# Patient Record
Sex: MALE | Race: WHITE | NOT HISPANIC OR LATINO | Employment: FULL TIME | URBAN - METROPOLITAN AREA
[De-identification: names, ages, dates, MRNs, and addresses within clinical notes are randomized per-mention and may not be internally consistent; named-entity substitution may affect disease eponyms.]

---

## 2012-09-21 LAB — CRC RECOMMENDATION EXT: NORMAL

## 2018-06-01 ENCOUNTER — HISTORICAL (OUTPATIENT)
Dept: ADMINISTRATIVE | Facility: HOSPITAL | Age: 58
End: 2018-06-01

## 2018-06-01 LAB
ALBUMIN SERPL-MCNC: 4.4 GM/DL (ref 3.4–5)
ALBUMIN/GLOB SERPL: 1.91 {RATIO} (ref 1.5–2.5)
ALP SERPL-CCNC: 59 UNIT/L (ref 38–126)
ALT SERPL-CCNC: 15 UNIT/L (ref 7–52)
AST SERPL-CCNC: 16 UNIT/L (ref 15–37)
BILIRUB SERPL-MCNC: 1.1 MG/DL (ref 0.2–1)
BILIRUBIN DIRECT+TOT PNL SERPL-MCNC: 0.2 MG/DL (ref 0–0.5)
BUN SERPL-MCNC: 14 MG/DL (ref 7–18)
CALCIUM SERPL-MCNC: 9.1 MG/DL (ref 8.5–10)
CHLORIDE SERPL-SCNC: 106 MMOL/L (ref 98–107)
CHOLEST SERPL-MCNC: 141 MG/DL (ref 0–200)
CHOLEST/HDLC SERPL: 3.4 {RATIO}
CO2 SERPL-SCNC: 28 MMOL/L (ref 21–32)
CREAT SERPL-MCNC: 1.29 MG/DL (ref 0.6–1.3)
CREAT/UREA NIT SERPL: 10.9
GGT SERPL-CCNC: ABNORMAL U/L (ref 5–85)
GLOBULIN SER-MCNC: 2.3 GM/DL (ref 1.2–3)
GLUCOSE SERPL-MCNC: 107 MG/DL (ref 74–106)
HDLC SERPL-MCNC: 42 MG/DL (ref 35–60)
LDH SERPL-CCNC: ABNORMAL U/L (ref 140–271)
LDLC SERPL CALC-MCNC: 69 MG/DL (ref 0–129)
POTASSIUM SERPL-SCNC: 4.2 MMOL/L (ref 3.5–5.1)
PROT SERPL-MCNC: 6.7 GM/DL (ref 6.4–8.2)
SODIUM SERPL-SCNC: 139 MMOL/L (ref 136–145)
TRIGL SERPL-MCNC: 89 MG/DL (ref 30–150)
VLDLC SERPL CALC-MCNC: 17.8 MG/DL

## 2019-03-01 ENCOUNTER — HISTORICAL (OUTPATIENT)
Dept: ADMINISTRATIVE | Facility: HOSPITAL | Age: 59
End: 2019-03-01

## 2019-03-01 LAB
ABS NEUT (OLG): 9.5 X10(3)/MCL (ref 2.1–9.2)
ALBUMIN SERPL-MCNC: 4.7 GM/DL (ref 3.4–5)
ALBUMIN/GLOB SERPL: 1.62 {RATIO} (ref 1.5–2.5)
ALP SERPL-CCNC: 73 UNIT/L (ref 38–126)
ALT SERPL-CCNC: 21 UNIT/L (ref 7–52)
APPEARANCE, UA: CLEAR
AST SERPL-CCNC: 21 UNIT/L (ref 15–37)
BACTERIA #/AREA URNS AUTO: ABNORMAL /HPF
BILIRUB SERPL-MCNC: 1.1 MG/DL (ref 0.2–1)
BILIRUB UR QL STRIP: ABNORMAL MG/DL
BILIRUBIN DIRECT+TOT PNL SERPL-MCNC: 0.2 MG/DL (ref 0–0.5)
BILIRUBIN DIRECT+TOT PNL SERPL-MCNC: 0.9 MG/DL
BUN SERPL-MCNC: 14 MG/DL (ref 7–18)
CALCIUM SERPL-MCNC: 9.6 MG/DL (ref 8.5–10)
CHLORIDE SERPL-SCNC: 100 MMOL/L (ref 98–107)
CHOLEST SERPL-MCNC: 147 MG/DL (ref 0–200)
CHOLEST/HDLC SERPL: 3.6 {RATIO}
CO2 SERPL-SCNC: 32 MMOL/L (ref 21–32)
COLOR UR: YELLOW
CREAT SERPL-MCNC: 1.28 MG/DL (ref 0.6–1.3)
ERYTHROCYTE [DISTWIDTH] IN BLOOD BY AUTOMATED COUNT: 12.6 % (ref 11.5–17)
GLOBULIN SER-MCNC: 2.9 GM/DL (ref 1.2–3)
GLUCOSE (UA): NEGATIVE MG/DL
GLUCOSE SERPL-MCNC: 112 MG/DL (ref 74–106)
HCT VFR BLD AUTO: 51 % (ref 42–52)
HDLC SERPL-MCNC: 41 MG/DL (ref 35–60)
HGB BLD-MCNC: 17.2 GM/DL (ref 14–18)
HGB UR QL STRIP: ABNORMAL UNIT/L
KETONES UR QL STRIP: ABNORMAL MG/DL
LDLC SERPL CALC-MCNC: 71 MG/DL (ref 0–129)
LEUKOCYTE ESTERASE UR QL STRIP: NEGATIVE UNIT/L
LYMPHOCYTES # BLD AUTO: 2.5 X10(3)/MCL (ref 0.6–3.4)
LYMPHOCYTES NFR BLD AUTO: 18.8 % (ref 13–40)
MCH RBC QN AUTO: 28.6 PG (ref 27–31.2)
MCHC RBC AUTO-ENTMCNC: 34 GM/DL (ref 32–36)
MCV RBC AUTO: 85 FL (ref 80–94)
MONOCYTES # BLD AUTO: 1.1 X10(3)/MCL (ref 0.1–1.3)
MONOCYTES NFR BLD AUTO: 8.4 % (ref 0.1–24)
NEUTROPHILS NFR BLD AUTO: 72.8 % (ref 47–80)
NITRITE UR QL STRIP.AUTO: NEGATIVE
PH UR STRIP: 5 [PH]
PLATELET # BLD AUTO: 305 X10(3)/MCL (ref 130–400)
PMV BLD AUTO: 8.7 FL (ref 9.4–12.4)
POTASSIUM SERPL-SCNC: 4.6 MMOL/L (ref 3.5–5.1)
PROT SERPL-MCNC: 7.6 GM/DL (ref 6.4–8.2)
PROT UR QL STRIP: NEGATIVE MG/DL
PSA SERPL-MCNC: 0.81 NG/ML (ref 0–3.5)
RBC # BLD AUTO: 6.01 X10(6)/MCL (ref 4.7–6.1)
RBC #/AREA URNS HPF: ABNORMAL /HPF
SODIUM SERPL-SCNC: 138 MMOL/L (ref 136–145)
SP GR UR STRIP: 1.02
SQUAMOUS EPITHELIAL, UA: ABNORMAL /LPF
TRIGL SERPL-MCNC: 85 MG/DL (ref 30–150)
UROBILINOGEN UR STRIP-ACNC: 0.2 MG/DL
VLDLC SERPL CALC-MCNC: 17 MG/DL
WBC # SPEC AUTO: 13.1 X10(3)/MCL (ref 4.5–11.5)
WBC #/AREA URNS AUTO: ABNORMAL /[HPF]

## 2020-03-05 ENCOUNTER — HISTORICAL (OUTPATIENT)
Dept: ADMINISTRATIVE | Facility: HOSPITAL | Age: 60
End: 2020-03-05

## 2020-03-05 LAB
ABS NEUT (OLG): 9.4 X10(3)/MCL (ref 2.1–9.2)
ALBUMIN SERPL-MCNC: 4.6 GM/DL (ref 3.4–5)
ALBUMIN/GLOB SERPL: 2.09 {RATIO} (ref 1.5–2.5)
ALP SERPL-CCNC: 65 UNIT/L (ref 38–126)
ALT SERPL-CCNC: 22 UNIT/L (ref 7–52)
APPEARANCE, UA: CLEAR
AST SERPL-CCNC: 20 UNIT/L (ref 15–37)
BACTERIA #/AREA URNS AUTO: ABNORMAL /HPF
BILIRUB SERPL-MCNC: 1.1 MG/DL (ref 0.2–1)
BILIRUB UR QL STRIP: NEGATIVE MG/DL
BILIRUBIN DIRECT+TOT PNL SERPL-MCNC: 0.2 MG/DL (ref 0–0.5)
BILIRUBIN DIRECT+TOT PNL SERPL-MCNC: 0.9 MG/DL
BUN SERPL-MCNC: 11 MG/DL (ref 7–18)
CALCIUM SERPL-MCNC: 9.5 MG/DL (ref 8.5–10)
CHLORIDE SERPL-SCNC: 101 MMOL/L (ref 98–107)
CHOLEST SERPL-MCNC: 145 MG/DL (ref 0–200)
CHOLEST/HDLC SERPL: 3.4 {RATIO}
CO2 SERPL-SCNC: 29 MMOL/L (ref 21–32)
COLOR UR: YELLOW
CREAT SERPL-MCNC: 1.2 MG/DL (ref 0.6–1.3)
ERYTHROCYTE [DISTWIDTH] IN BLOOD BY AUTOMATED COUNT: 12.2 % (ref 11.5–17)
EST. AVERAGE GLUCOSE BLD GHB EST-MCNC: 108 MG/DL
GLOBULIN SER-MCNC: 2.2 GM/DL (ref 1.2–3)
GLUCOSE (UA): NEGATIVE MG/DL
GLUCOSE SERPL-MCNC: 114 MG/DL (ref 74–106)
HBA1C MFR BLD: 5.4 % (ref 4.4–6.4)
HCT VFR BLD AUTO: 51.1 % (ref 42–52)
HDLC SERPL-MCNC: 43 MG/DL (ref 35–60)
HGB BLD-MCNC: 16.7 GM/DL (ref 14–18)
HGB UR QL STRIP: ABNORMAL UNIT/L
KETONES UR QL STRIP: NEGATIVE MG/DL
LDLC SERPL CALC-MCNC: 76 MG/DL (ref 0–129)
LEUKOCYTE ESTERASE UR QL STRIP: NEGATIVE UNIT/L
LYMPHOCYTES # BLD AUTO: 2.2 X10(3)/MCL (ref 0.6–3.4)
LYMPHOCYTES NFR BLD AUTO: 18.4 % (ref 13–40)
MCH RBC QN AUTO: 27.8 PG (ref 27–31.2)
MCHC RBC AUTO-ENTMCNC: 33 GM/DL (ref 32–36)
MCV RBC AUTO: 85 FL (ref 80–94)
MONOCYTES # BLD AUTO: 0.5 X10(3)/MCL (ref 0.1–1.3)
MONOCYTES NFR BLD AUTO: 4.2 % (ref 0.1–24)
NEUTROPHILS NFR BLD AUTO: 77.4 % (ref 47–80)
NITRITE UR QL STRIP.AUTO: NEGATIVE
PH UR STRIP: 7 [PH]
PLATELET # BLD AUTO: 312 X10(3)/MCL (ref 130–400)
PMV BLD AUTO: 9.1 FL (ref 9.4–12.4)
POTASSIUM SERPL-SCNC: 4.3 MMOL/L (ref 3.5–5.1)
PROT SERPL-MCNC: 6.8 GM/DL (ref 6.4–8.2)
PROT UR QL STRIP: NEGATIVE MG/DL
PSA SERPL-MCNC: 1.03 NG/ML (ref 0–3.5)
RBC # BLD AUTO: 6.01 X10(6)/MCL (ref 4.7–6.1)
RBC #/AREA URNS HPF: ABNORMAL /HPF
SODIUM SERPL-SCNC: 137 MMOL/L (ref 136–145)
SP GR UR STRIP: 1.02
SQUAMOUS EPITHELIAL, UA: ABNORMAL /LPF
TRIGL SERPL-MCNC: 110 MG/DL (ref 30–150)
UROBILINOGEN UR STRIP-ACNC: 0.2 MG/DL
VLDLC SERPL CALC-MCNC: 22 MG/DL
WBC # SPEC AUTO: 12.1 X10(3)/MCL (ref 4.5–11.5)
WBC #/AREA URNS AUTO: ABNORMAL /[HPF]

## 2021-04-01 ENCOUNTER — HISTORICAL (OUTPATIENT)
Dept: ADMINISTRATIVE | Facility: HOSPITAL | Age: 61
End: 2021-04-01

## 2021-04-01 LAB
ABS NEUT (OLG): 5.3 X10(3)/MCL (ref 2.1–9.2)
ALBUMIN SERPL-MCNC: 4.5 GM/DL (ref 3.4–5)
ALBUMIN/GLOB SERPL: 2.14 {RATIO} (ref 1.5–2.5)
ALP SERPL-CCNC: 58 UNIT/L (ref 38–126)
ALT SERPL-CCNC: 16 UNIT/L (ref 7–52)
APPEARANCE, UA: CLEAR
AST SERPL-CCNC: 19 UNIT/L (ref 15–37)
BACTERIA #/AREA URNS AUTO: ABNORMAL /HPF
BILIRUB SERPL-MCNC: 1 MG/DL (ref 0.2–1)
BILIRUB UR QL STRIP: NEGATIVE MG/DL
BILIRUBIN DIRECT+TOT PNL SERPL-MCNC: 0.2 MG/DL (ref 0–0.5)
BILIRUBIN DIRECT+TOT PNL SERPL-MCNC: 0.8 MG/DL
BUN SERPL-MCNC: 13 MG/DL (ref 7–18)
CALCIUM SERPL-MCNC: 9.8 MG/DL (ref 8.5–10.1)
CHLORIDE SERPL-SCNC: 103 MMOL/L (ref 98–107)
CHOLEST SERPL-MCNC: 138 MG/DL (ref 0–200)
CHOLEST/HDLC SERPL: 2.9 {RATIO}
CO2 SERPL-SCNC: 27 MMOL/L (ref 21–32)
COLOR UR: YELLOW
CREAT SERPL-MCNC: 1.19 MG/DL (ref 0.6–1.3)
ERYTHROCYTE [DISTWIDTH] IN BLOOD BY AUTOMATED COUNT: 12.4 % (ref 11.5–17)
EST CREAT CLEARANCE SER (OHS): 73.3 ML/MIN
EST. AVERAGE GLUCOSE BLD GHB EST-MCNC: 105 MG/DL
GLOBULIN SER-MCNC: 2.1 GM/DL (ref 1.2–3)
GLUCOSE (UA): NEGATIVE MG/DL
GLUCOSE SERPL-MCNC: 109 MG/DL (ref 74–106)
HBA1C MFR BLD: 5.3 % (ref 4.4–6.4)
HCT VFR BLD AUTO: 48 % (ref 42–52)
HDLC SERPL-MCNC: 48 MG/DL (ref 35–60)
HGB BLD-MCNC: 15.9 GM/DL (ref 14–18)
HGB UR QL STRIP: ABNORMAL UNIT/L
KETONES UR QL STRIP: NEGATIVE MG/DL
LDLC SERPL CALC-MCNC: 69 MG/DL (ref 0–129)
LEUKOCYTE ESTERASE UR QL STRIP: NEGATIVE UNIT/L
LYMPHOCYTES # BLD AUTO: 2 X10(3)/MCL (ref 0.6–3.4)
LYMPHOCYTES NFR BLD AUTO: 25.7 % (ref 13–40)
MCH RBC QN AUTO: 28.3 PG (ref 27–31.2)
MCHC RBC AUTO-ENTMCNC: 33 GM/DL (ref 32–36)
MCV RBC AUTO: 85 FL (ref 80–94)
MONOCYTES # BLD AUTO: 0.5 X10(3)/MCL (ref 0.1–1.3)
MONOCYTES NFR BLD AUTO: 7 % (ref 0.1–24)
NEUTROPHILS NFR BLD AUTO: 67.3 % (ref 47–80)
NITRITE UR QL STRIP.AUTO: NEGATIVE
PH UR STRIP: 6.5 [PH]
PLATELET # BLD AUTO: 274 X10(3)/MCL (ref 130–400)
PMV BLD AUTO: 9.2 FL (ref 9.4–12.4)
POTASSIUM SERPL-SCNC: 4.5 MMOL/L (ref 3.5–5.1)
PROT SERPL-MCNC: 6.6 GM/DL (ref 6.4–8.2)
PROT UR QL STRIP: NEGATIVE MG/DL
PSA SERPL-MCNC: 1.12 NG/ML (ref 0–4.5)
RBC # BLD AUTO: 5.62 X10(6)/MCL (ref 4.7–6.1)
RBC #/AREA URNS HPF: ABNORMAL /HPF
SODIUM SERPL-SCNC: 139 MMOL/L (ref 136–145)
SP GR UR STRIP: 1.02
SQUAMOUS EPITHELIAL, UA: ABNORMAL /LPF
TRIGL SERPL-MCNC: 89 MG/DL (ref 30–150)
UROBILINOGEN UR STRIP-ACNC: 0.2 MG/DL
VLDLC SERPL CALC-MCNC: 17.8 MG/DL
WBC # SPEC AUTO: 7.8 X10(3)/MCL (ref 4.5–11.5)
WBC #/AREA URNS AUTO: ABNORMAL /[HPF]

## 2021-04-29 ENCOUNTER — HISTORICAL (OUTPATIENT)
Dept: ADMINISTRATIVE | Facility: HOSPITAL | Age: 61
End: 2021-04-29

## 2021-04-29 LAB
APPEARANCE, UA: CLEAR
BACTERIA #/AREA URNS AUTO: ABNORMAL /HPF
BILIRUB UR QL STRIP: NEGATIVE MG/DL
COLOR UR: YELLOW
GLUCOSE (UA): NEGATIVE MG/DL
HGB UR QL STRIP: ABNORMAL UNIT/L
KETONES UR QL STRIP: ABNORMAL MG/DL
LEUKOCYTE ESTERASE UR QL STRIP: NEGATIVE UNIT/L
NITRITE UR QL STRIP.AUTO: NEGATIVE
PH UR STRIP: 7 [PH]
PROT UR QL STRIP: NEGATIVE MG/DL
RBC #/AREA URNS HPF: ABNORMAL /HPF
SP GR UR STRIP: 1.02
SQUAMOUS EPITHELIAL, UA: ABNORMAL /LPF
UROBILINOGEN UR STRIP-ACNC: 0.2 MG/DL
WBC #/AREA URNS AUTO: ABNORMAL /[HPF]

## 2022-05-03 NOTE — HISTORICAL [facility]ER
This is a historical note converted from [place]. Formatting and pictures may have been removed.  Please reference [place] for original formatting and attached multimedia. Chief Complaint  CPX/Fasting  History of Present Illness  Patient is here today for wellness CPX.? He is tolerating all medications without side effects. ?He continues to exercise regularly.? He did fall while duck hunting?and landed on a 2 x 4?on his left?chest wall.? He broke a couple ribs. ?This was approximately 2 months ago and the area has?healed.  Review of Systems  GENERAL:?no? unexplained wt ?loss or gain, no fever, fatigue, chills, night sweats or ?weakness  HEENT: no?? sore throat, ?ear pain, ?sinus pressure, ?nasal congestion, or ?rhinorrhea  VISION:?no ?vision changes, ?glaucoma, cataracts, +glasses  CARDIAC: no? chest pain,??palpitations,?Dyspnea on exertion, ?orthopnea  RESPIRATORY:?no??cough,?wheezing, sputum production or?SOB  GI: no???abdominal pain, n&v,?constipation, diarrhea,??blood in stool or_no ?family history of colon cancer?_  :?no? dysuria, ?hematuria, ?frequency, urgency, ?incontinence,? testicular pain/swelling,?+?family history of prostate cancerfather?and uncle  MUSC/SKEL:? no? myalgia, ?weakness, edema,? arthralgia, or ?joint effusion-- Broke Right ribs 2 months ago, pain improved  SKIN:? No?rash, hives,?itching or?sores--dry skin on ankle  NEURO:? No?headaches, numbness, tingling,? weakness, or ?dizziness  PSYCH:? No anxiety, depression, ?irritability, ?suicidal ideation or hallucinations  ENDO:? No ?polyuria, ?polydipsia, ?polyphagia  HEME:? No Bruising, lymphadenopathy, bleeding disorders ?or?signs of anemia  Physical Exam  Vitals & Measurements  HR:?62(Peripheral)? BP:?136/86?  HT:?175.2?cm? WT:?79.3?kg? BMI:?25.83?  GENERAL: NAD, alert and oriented x 3  SKIN:? no rash or abnormal appearing skin lesions  HEENT:? PERRLA, EOMI, mouth wnl, throat wnl, EAC and TM wnl bilaterally  NECK:? FROM, no lymphadenopathy, no  thyroid abnormalities palpable  CHEST:? CTA bilaterally no wheezes, crackles or rubs  CARDIAC:? RRR, no murmurs audible  ABDOMEN:? Soft, nontender, nondistended, NBSx4,?no rebound or guarding, no HSM  EXTREMITIES:? no clubbing, cyanosis, or edema.? joints wnl. +2 DP/PT pulse bilaterally  NEURO:? no sensory or motor deficits noted. CN II-XII intact. Gait wnl.?  GENITAL: normal testes, no hernia  RECTAL:?+?Nonthrombosed hemorrhoid, no? fissures, prostate WNL  Assessment/Plan  1.?Wellness examination?Z00.00  ?CBC, CMP, FLP, PSA, U/A, colonoscopy 2012 due 2022, Encourage pt to increase cardiovascular exercise and attempt to obtain at least 150 minutes of moderate aerobic exercise per week or 75 minutes of vigorous aerobic exercise weekly.  Ordered:  CBC w/ Auto Diff, Routine collect, 03/05/20 9:13:00 CST, Blood, Order for future visit, Stop date 03/05/20 9:13:00 CST, Lab Collect, Wellness examination, 03/05/20 9:13:00 CST  Clinic Follow-Up Wellness, *Est. 03/05/21 3:00:00 CST, Order for future visit, Wellness examination  Hypercholesterolemia  Chronic insomnia, [facility] AFP  Comprehensive Metabolic Panel, Routine collect, 03/05/20 9:13:00 CST, Blood, Order for future visit, Stop date 03/05/20 9:13:00 CST, Lab Collect, Wellness examination  Hypercholesterolemia, 03/05/20 9:13:00 CST  Lab Collection Request, 03/05/20 9:13:00 CST, [facility]LINK AMB - AFP, 03/05/20 9:13:00 CST, Wellness examination  Lipid Panel, Routine collect, 03/05/20 9:13:00 CST, Blood, Order for future visit, Stop date 03/05/20 9:13:00 CST, Lab Collect, Wellness examination  Hypercholesterolemia, 03/05/20 9:13:00 CST  Preventative Health Care Est 40-64 years [id number] PC, Wellness examination  Hypercholesterolemia  Chronic insomnia, [facility] AMB - AFP, 03/05/20 9:14:00 CST  Urinalysis without Reflex, Routine collect, Urine, Order for future visit, 03/05/20 9:13:00 CST, Stop date 03/05/20 9:13:00 CST, Nurse collect, Wellness examination  ?  2.?Screening for  prostate cancer?Z12.5  ?PSA  Ordered:  Prostate Specific Antigen, Routine collect, 03/05/20 9:13:00 CST, Blood, Order for future visit, Stop date 03/05/20 9:13:00 CST, Lab Collect, Screening for prostate cancer, 03/05/20 9:13:00 CST  ?  3.?Immunization due?Z23  ?shingrix #1  Ordered:  zoster vaccine, inactivated, 0.5 mL, IM, Once-NOW, first dose 03/05/20 9:13:00 CST, stop date 03/05/20 9:13:00 CST  ?  4.?Hypercholesterolemia?E78.00  ?continue atorvastatin 20 mg  Ordered:  Clinic Follow-Up Wellness, *Est. 03/05/21 3:00:00 CST, Order for future visit, Wellness examination  Hypercholesterolemia  Chronic insomnia, [facility] AFP  Comprehensive Metabolic Panel, Routine collect, 03/05/20 9:13:00 CST, Blood, Order for future visit, Stop date 03/05/20 9:13:00 CST, Lab Collect, Wellness examination  Hypercholesterolemia, 03/05/20 9:13:00 CST  Lipid Panel, Routine collect, 03/05/20 9:13:00 CST, Blood, Order for future visit, Stop date 03/05/20 9:13:00 CST, Lab Collect, Wellness examination  Hypercholesterolemia, 03/05/20 9:13:00 CST  Preventative Health Care Est 40-64 years [id number] PC, Wellness examination  Hypercholesterolemia  Chronic insomnia, [facility]LINK AMB - AFP, 03/05/20 9:14:00 CST  ?  5.?Chronic insomnia?G47.00  ?continue ambien CR 12.5 qhs  Ordered:  Clinic Follow-Up Wellness, *Est. 03/05/21 3:00:00 CST, Order for future visit, Wellness examination  Hypercholesterolemia  Chronic insomnia, HLink AFP  Preventative Health Care Est 40-64 years [id number] PC, Wellness examination  Hypercholesterolemia  Chronic insomnia, [facility] AMB - AFP, 03/05/20 9:14:00 CST  ?  Orders:  zolpidem, See Instructions, TAKE 1 TABLET ONCE DAILY ATBEDTIME AS NEEDED FOR SLEEP, # 90 tab(s), 1 Refill(s), Pharmacy: [facility], 175.2, cm, Height/Length Dosing, 03/05/20 9:02:00 CST, 79.3, kg, Weight Dosing, 03/05/20 9:02:00 CST  Referrals  Clinic Follow-Up Wellness, *Est. 03/05/21 3:00:00 CST, Order for future visit, Wellness  examination  Hypercholesterolemia  Chronic insomnia, HLink AFP   Problem List/Past Medical History  Ongoing  Acute URI  Hypercholesterolemia  Wellness examination  Historical  Chronic insomnia  Hypercholesterolemia  Nephrolithiasis  Procedure/Surgical History  DENTAL IMPLANT (2016)  Colonoscopy (09/21/2012)  DONATED BONE MARROW X2  RIGHT ELBOW SURGERY  Tonsillectomy  Vasectomy   Medications  atorvastatin 20 mg oral tablet, See Instructions  Shingrix, 0.5 mL, IM, Once-NOW  zolpidem 12.5 mg oral tablet, extended release, See Instructions, 1 refills  Allergies  No Known Medication Allergies  Social History  Abuse/Neglect  No, 03/05/2020  Alcohol  Current, 1-2 times per month, 02/28/2018  Employment/School  Employed, Work/School description: ., 12/21/2018  Home/Environment  Lives with Children, Spouse., 12/21/2018  Tobacco  Never (less than 100 in lifetime), Oral, N/A, Smokeless Tobacco Use: Former smokeless tobacco user, quit more than 30 days ago., 03/05/2020  Never (less than 100 in lifetime), Oral, N/A, 03/01/2019  Smokeless tobacco, Oral, N/A, 12/21/2018  Never smoker, Oral, 02/28/2018  Family History  Epilepsy: Brother.  Gallbladder disease: Brother.  Healthy adult: Mother, Sister and Sister.  Primary malignant neoplasm of prostate: Father.  Stroke: Father.  Immunizations  Vaccine Date Status   tetanus/diphth/pertuss (Tdap) adult/adol 03/31/2015 Recorded   influenza virus vaccine, inactivated 10/02/2014 Recorded   Health Maintenance  Health Maintenance  ???Pending?(in the next year)  ??? ??OverDue  ??? ? ? ?Diabetes Screening due??and every?  ??? ? ? ?Alcohol Misuse Screening due??01/01/20??and every 1??year(s)  ??? ? ? ?Smoking Cessation due??01/01/20??Variable frequency  ??? ??Due?  ??? ? ? ?Depression Screening due??02/29/20??and every 1??year(s)  ??? ? ? ?ADL Screening due??03/05/20??and every 1??year(s)  ??? ? ? ?Aspirin Therapy for CVD Prevention due??03/05/20??and every 1??year(s)  ??? ?  ? ?Lung Cancer Screening due??03/05/20??and every 1??year(s)  ??? ??Due In Future?  ??? ? ? ?Obesity Screening not due until??01/01/21??and every 1??year(s)  ???Satisfied?(in the past 1 year)  ??? ??Satisfied?  ??? ? ? ?Blood Pressure Screening on??03/05/20.??Satisfied by Kim Baca CMA  ??? ? ? ?Body Mass Index Check on??03/05/20.??Satisfied by Kim Baca CMA  ??? ? ? ?Obesity Screening on??03/05/20.??Satisfied by Kim Baca CMA  ?

## 2022-05-03 NOTE — HISTORICAL [facility]
This is a historical note converted from [place]. Formatting and pictures may have been removed.  Please reference [place] for original formatting and attached multimedia. Chief Complaint  cpx/fasting  History of Present Illness  Patient is here today for wellness CPX. ?He is tolerating all medications without side effects. ?He continues to exercise 5 days a week?without problems.? He is considering?getting the Covid vaccine was given L [facility]?information to schedule.  Review of Systems  GENERAL:?no? unexplained wt ?loss or gain, fever, fatigue, chills, night sweats or ?weakness  HEENT: no?? sore throat, ?ear pain, ?sinus pressure, ?nasal congestion, or ?rhinorrhea +tinnitus  VISION:?no ?vision changes, ?glaucoma, cataracts, + glasses  CARDIAC: no? chest pain,??palpitations,?Dyspnea on exertion, ?orthopnea  RESPIRATORY:?no??cough,?wheezing, sputum production or?SOB  GI: no???abdominal pain, n&v,?constipation, diarrhea,??blood in stool or_?no family history of colon cancer?_  :?no? dysuria, ?hematuria, ?frequency, urgency, ?incontinence,? testicular pain/swelling,?+?family history of prostate cancerfather?dx age late 70s  MUSC/SKEL:? no? myalgia, ?weakness, edema,? + right elbow arthralgia, or ?no joint effusion  SKIN:? No?rash, hives,?itching or?sores  NEURO:? No?headaches, numbness, tingling,? weakness, or ?dizziness  PSYCH:? No anxiety, depression, ?irritability, ?suicidal ideation or hallucinations  ENDO:? No ?polyuria, ?polydipsia, ?polyphagia  HEME:? No Bruising, lymphadenopathy, bleeding disorders ?or?signs of anemia  Physical Exam  Vitals & Measurements  T:?36.5? ?C (Oral)? HR:?68(Peripheral)? BP:?134/84?  HT:?175.00?cm? WT:?78.500?kg? BMI:?25.63?  GENERAL: NAD, alert and oriented x 3  SKIN:? no rash or abnormal appearing skin lesions, flesh-colored SK on left upper forehead  HEENT:? PERRLA, EOMI, EAC and TM wnl bilaterally  NECK:? FROM, no lymphadenopathy, no thyroid abnormalities palpable  CHEST:? CTA  bilaterally no wheezes, crackles or rubs  CARDIAC:? RRR, no murmurs audible  ABDOMEN:? Soft, nontender, nondistended, NBSx4,?no rebound or guarding, no HSM  EXTREMITIES:? no clubbing, cyanosis, or edema.? joints wnl. +2 DP/PT pulse bilaterally  NEURO:? no sensory or motor deficits noted. CN II-XII intact. Gait wnl.?  GENITAL: normal testes, no hernia  RECTAL:?+NT ?hemorrhoid, ?prostate WNL  Assessment/Plan  1.?Wellness examination?Z00.00  ?CBC, CMP, FLP, PSA, U/A, colonoscopy 9/2012 due 2022, Encourage pt to increase cardiovascular exercise and attempt to obtain at least 150 minutes of moderate aerobic exercise per week or 75 minutes of vigorous aerobic exercise weekly.  Ordered:  CBC w/ Auto Diff, Routine collect, 04/01/21 8:03:00 CDT, Blood, Order for future visit, Stop date 04/01/21 8:03:00 CDT, Lab Collect, Wellness examination, 04/01/21 8:03:00 CDT  Clinic Follow-Up Wellness, *Est. 04/01/22 3:00:00 CDT, Order for future visit, Wellness examination  Hypercholesterolemia  Chronic insomnia, HLink AFP  Comprehensive Metabolic Panel, Routine collect, 04/01/21 8:03:00 CDT, Blood, Order for future visit, Stop date 04/01/21 8:03:00 CDT, Lab Collect, Wellness examination  Hypercholesterolemia, 04/01/21 8:03:00 CDT  Lab Collection Request, 04/01/21 8:03:00 CDT, HLINK AMB - AFP, 04/01/21 8:03:00 CDT, Wellness examination  Hypercholesterolemia  Chronic insomnia  Lipid Panel, Routine collect, 04/01/21 8:03:00 CDT, Blood, Order for future visit, Stop date 04/01/21 8:03:00 CDT, Lab Collect, Wellness examination  Hypercholesterolemia, 04/01/21 8:03:00 CDT  Preventative Health Care Est 40-64 years [id number] PC, Wellness examination  Hypercholesterolemia  Chronic insomnia, HLINK AMB - AFP, 04/01/21 8:03:00 CDT  Urinalysis no Reflex, Routine collect, Urine, Order for future visit, 04/01/21 8:03:00 CDT, Stop date 04/01/21 8:03:00 CDT, Nurse collect, Wellness examination  ?  2.?Prostate cancer  screening?Z12.5  ?PSA  Ordered:  Prostate Specific Antigen, Routine collect, 04/01/21 8:03:00 CDT, Blood, Order for future visit, Stop date 04/01/21 8:03:00 CDT, Lab Collect, Prostate cancer screening, 04/01/21 8:03:00 CDT  ?  3.?Encounter for immunization?Z23  consider covid  ?  4.?Hypercholesterolemia?E78.00  ?continue atorvastatin 20 mg  Ordered:  Clinic Follow-Up Wellness, *Est. 04/01/22 3:00:00 CDT, Order for future visit, Wellness examination  Hypercholesterolemia  Chronic insomnia, HLink AFP  Comprehensive Metabolic Panel, Routine collect, 04/01/21 8:03:00 CDT, Blood, Order for future visit, Stop date 04/01/21 8:03:00 CDT, Lab Collect, Wellness examination  Hypercholesterolemia, 04/01/21 8:03:00 CDT  Lab Collection Request, 04/01/21 8:03:00 CDT, HLINK AMB - AFP, 04/01/21 8:03:00 CDT, Wellness examination  Hypercholesterolemia  Chronic insomnia  Lipid Panel, Routine collect, 04/01/21 8:03:00 CDT, Blood, Order for future visit, Stop date 04/01/21 8:03:00 CDT, Lab Collect, Wellness examination  Hypercholesterolemia, 04/01/21 8:03:00 CDT  Preventative Health Care Est 40-64 years [id number] PC, Wellness examination  Hypercholesterolemia  Chronic insomnia, HLINK AMB - AFP, 04/01/21 8:03:00 CDT  ?  5.?Chronic insomnia?G47.00  ?continue Ambien ER 10 mg qhs  Ordered:  Clinic Follow-Up Wellness, *Est. 04/01/22 3:00:00 CDT, Order for future visit, Wellness examination  Hypercholesterolemia  Chronic insomnia, HLink AFP  Lab Collection Request, 04/01/21 8:03:00 CDT, HLINK AMB - AFP, 04/01/21 8:03:00 CDT, Wellness examination  Hypercholesterolemia  Chronic insomnia  Preventative Health Care Est 40-64 years [id number] PC, Wellness examination  Hypercholesterolemia  Chronic insomnia, HLINK AMB - AFP, 04/01/21 8:03:00 CDT  ?  Referrals  Clinic Follow-Up Wellness, *Est. 04/01/22 3:00:00 CDT, Order for future visit, Wellness examination  Hypercholesterolemia  Chronic insomnia, HLink AFP   Problem List/Past Medical  History  Ongoing  Acute URI  Hypercholesterolemia  Wellness examination  Historical  Chronic insomnia  Hypercholesterolemia  Nephrolithiasis  Procedure/Surgical History  DENTAL IMPLANT (2016)  Colonoscopy (09/21/2012)  DONATED BONE MARROW X2  RIGHT ELBOW SURGERY  Tonsillectomy  Vasectomy   Medications  atorvastatin 20 mg oral tablet, See Instructions  zolpidem 12.5 mg oral tablet, extended release, 12.5 mg= 1 tab(s), Oral, qPM, 2 refills  Allergies  No Known Medication Allergies  Social History  Abuse/Neglect  No, 04/01/2021  No, 03/05/2020  Alcohol  Current, 1-2 times per month, 02/28/2018  Employment/School  Employed, Work/School description: ., 12/21/2018  Home/Environment  Lives with Children, Spouse., 12/21/2018  Tobacco  Smoker, current status unknown, Oral, No, 04/01/2021  Never (less than 100 in lifetime), Oral, N/A, Smokeless Tobacco Use: Former smokeless tobacco user, quit more than 30 days ago., 03/05/2020  Never (less than 100 in lifetime), Oral, N/A, 03/01/2019  Smokeless tobacco, Oral, N/A, 12/21/2018  Never smoker, Oral, 02/28/2018  Family History  Acute myocardial infarction.: Brother.  Coronary artery stent: Brother.  Epilepsy: Brother.  Gallbladder disease: Brother.  Healthy adult: Mother, Sister and Sister.  Neuropathy: Father.  Primary malignant neoplasm of prostate: Father.  Stroke: Father.  Immunizations  Vaccine Date Status   influenza virus vaccine, inactivated 12/04/2020 Recorded   zoster vaccine, inactivated 05/05/2020 Given   zoster vaccine, inactivated 03/05/2020 Given   tetanus/diphth/pertuss (Tdap) adult/adol 03/31/2015 Recorded   influenza virus vaccine, inactivated 10/02/2014 Recorded   Health Maintenance  Health Maintenance  ???Pending?(in the next year)  ??? ??OverDue  ??? ? ? ?Depression Screening due??02/29/20??and every 1??year(s)  ??? ? ? ?Smoking Cessation due??01/01/21??Variable frequency  ??? ? ? ?Alcohol Misuse Screening due??01/02/21??and every  1??year(s)  ??? ??Due?  ??? ? ? ?ADL Screening due??04/01/21??and every 1??year(s)  ??? ? ? ?Aspirin Therapy for CVD Prevention due??04/01/21??and every 1??year(s)  ??? ? ? ?Lung Cancer Screening due??04/01/21??and every 1??year(s)  ??? ??Due In Future?  ??? ? ? ?Influenza Vaccine not due until??10/01/21??and every 1??day(s)  ??? ? ? ?Obesity Screening not due until??01/01/22??and every 1??year(s)  ???Satisfied?(in the past 1 year)  ??? ??Satisfied?  ??? ? ? ?Blood Pressure Screening on??04/01/21.??Satisfied by Kim Baca CMA  ??? ? ? ?Body Mass Index Check on??04/01/21.??Satisfied by Kim Baca CMA  ??? ? ? ?Influenza Vaccine on??12/04/20.??Satisfied by Kim Baca CMA  ??? ? ? ?Obesity Screening on??04/01/21.??Satisfied by Kim Baca CMA  ?

## 2022-05-03 NOTE — HISTORICAL [facility]ERNER
This is a historical note converted from [place]. Formatting and pictures may have been removed.  Please reference [place] for original formatting and attached multimedia. Chief Complaint  CPX/FASTING  History of Present Illness  Patient is here today for wellness CPX.? He is tolerating his cholesterol medications without side effects. ?He does continue to use?Ambien CR nightly?for chronic insomnia.? He does exercise 5 days a week doing both cardiovascular and weight work.  Review of Systems  GENERAL:?no? unexplained wt ?loss or gain, no fever, fatigue, chills, night sweats or ?weakness  HEENT: no?? sore throat, ?ear pain, ?sinus pressure, + ?nasal congestion(cold currently), or ?rhinorrhea  VISION:?no ?vision changes, ?glaucoma, cataracts, +glasses  CARDIAC: no? chest pain,??palpitations,?Dyspnea on exertion, ?orthopnea  RESPIRATORY:?no??cough+ with cold ,?wheezing, sputum production or?SOB  GI: no???abdominal pain, n&v,?constipation, diarrhea,??blood in stool or_?no family history of colon cancer?_  :?no? dysuria, ?hematuria, ?frequency, urgency, ?incontinence,? testicular pain/swelling,?+?family history of prostate cancerfather  [facility]:? no? myalgia, ?weakness, edema,? + right elbow arthralgia, or ?joint effusion  SKIN:? No?rash, hives,?itching or?sores  NEURO:? No?headaches, numbness +right 5th finger at times, tingling,? weakness, or ?dizziness  PSYCH:? No anxiety, depression, ?irritability, ?suicidal ideation or hallucinations  ENDO:? No ?polyuria, ?polydipsia, ?polyphagia  HEME:? No Bruising, lymphadenopathy, bleeding disorders ?or?signs of anemia  Physical Exam  Vitals & Measurements  HR:?72(Peripheral)? BP:?124/82?  HT:?175.2?cm? WT:?78.6?kg? BMI:?25.61?  GENERAL: NAD, alert and oriented x 3  SKIN:? no rash or abnormal appearing skin lesions  HEENT:? PERRLA, EOMI, mouth wnl, throat wnl, EAC and TM wnl bilaterally  NECK:? FROM, no lymphadenopathy, no thyroid abnormalities palpable  CHEST:? CTA bilaterally  no wheezes, crackles or rubs  CARDIAC:? RRR, no murmurs audible  ABDOMEN:? Soft, nontender, nondistended, NBSx4,?no rebound or guarding, no HSM  EXTREMITIES:? no clubbing, cyanosis, or edema.? joints wnl. +2 DP/PT pulse bilaterally  NEURO:? no sensory or motor deficits noted. CN II-XII intact. Gait wnl.?  GENITAL: normal testes, no hernia  RECTAL: no hemorrhoids or fissures, prostate WNL  Assessment/Plan  1.?Wellness examination?Z00.00  ?CBC, CMP, FLP, U/A, PSA,? colonoscopy 2012 due 2022,?encourage patient to continue?his current exercise regimen.  Ordered:  Automated Diff, Routine collect, 03/01/19 9:15:00 CST, Blood, Collected, Stop date 03/01/19 9:15:00 CST, Lab Collect, Wellness examination, 03/01/19 9:15:00 CST  CBC w/ Auto Diff, Routine collect, 03/01/19 9:15:00 CST, Blood, Stop date 03/01/19 9:15:00 CST, Lab Collect, Wellness examination, 03/01/19 9:15:00 CST  Clinic Follow up, *Est. 03/01/20 3:00:00 CST, Order for future visit, Wellness examination  Hypercholesterolemia  Chronic insomnia, [facility] AFP  Comprehensive Metabolic Panel, Routine collect, 03/01/19 9:15:00 CST, Blood, Stop date 03/01/19 9:15:00 CST, Lab Collect, Wellness examination  Hypercholesterolemia, 03/01/19 9:15:00 CST  Preventative Health Care Est 40-64 years [id number] PC, Wellness examination  Hypercholesterolemia  Chronic insomnia, [facility] AMB - AFP, 03/01/19 9:13:00 CST  Urinalysis Complete no reflex, Routine collect, Urine, 03/01/19 9:15:00 CST, Stop date 03/01/19 9:15:00 CST, Nurse collect, Wellness examination  ?  2.?Prostate cancer screening?Z12.5  ?PSA  Ordered:  Prostate Specific Antigen, Routine collect, 03/01/19 9:15:00 CST, Blood, Stop date 03/01/19 9:15:00 CST, Lab Collect, Prostate cancer screening, 03/01/19 9:15:00 CST  ?  3.?Hypercholesterolemia?E78.00  ?continue atorvastatin 20 mg  Ordered:  Clinic Follow up, *Est. 03/01/20 3:00:00 CST, Order for future visit, Wellness examination  Hypercholesterolemia  Chronic insomnia,  [facility] AFP  Comprehensive Metabolic Panel, Routine collect, 03/01/19 9:15:00 CST, Blood, Stop date 03/01/19 9:15:00 CST, Lab Collect, Wellness examination  Hypercholesterolemia, 03/01/19 9:15:00 CST  Lipid Panel, Routine collect, 03/01/19 9:15:00 CST, Blood, Stop date 03/01/19 9:15:00 CST, Lab Collect, Hypercholesterolemia, 03/01/19 9:15:00 CST  Preventative Health Care Est 40-64 years [id number] PC, Wellness examination  Hypercholesterolemia  Chronic insomnia, IN[facility] AMB - AFP, 03/01/19 9:13:00 CST  ?  4.?Chronic insomnia?G47.00  ?continue ambien CR 12.5 nightly  Ordered:  Clinic Follow up, *Est. 03/01/20 3:00:00 CST, Order for future visit, Wellness examination  Hypercholesterolemia  Chronic insomnia, ink [facility]  Preventative Health Care Est 40-64 years [id number] PC, Wellness examination  Hypercholesterolemia  Chronic insomnia, [facility] AMB - AFP, 03/01/19 9:13:00 CST  ?   Problem List/Past Medical History  Ongoing  Acute URI  Hypercholesterolemia  Wellness examination  Historical  Chronic insomnia  Hypercholesterolemia  Procedure/Surgical History  DENTAL IMPLANT (2016)  Colonoscopy (09/21/2012)  DONATED BONE MARROW X2  RIGHT ELBOW SURGERY  Tonsillectomy  Vasectomy   Medications  atorvastatin 20 mg oral tablet, See Instructions, 3 refills  chlorpheniramine-hydrocodone 8 mg-10 mg/5 mL oral suspension, extended release, 5 mL, Oral, BID  Mucinex DM 30 mg-600 mg oral tab, extended release, 1 tab(s), Oral, q12hr  zolpidem 12.5 mg oral tablet, extended release, See Instructions, 1 refills  Allergies  No Known Medication Allergies  Social History  Alcohol  Current, 1-2 times per month, 02/28/2018  Employment/School  Employed, Work/School description: ., 12/21/2018  Home/Environment  Lives with Children, Spouse., 12/21/2018  Tobacco  Never (less than 100 in lifetime), Oral, N/A, 03/01/2019  Smokeless tobacco, Oral, N/A, 12/21/2018  Never smoker, Oral, 02/28/2018  Family History  Epilepsy: Brother.  Gallbladder  disease: Brother.  Healthy adult: Sister and Sister.  Primary malignant neoplasm of prostate: Father.  Stroke: Father.  Immunizations  Vaccine Date Status   tetanus/diphth/pertuss (Tdap) adult/adol 03/31/2015 Recorded   influenza virus vaccine, inactivated 10/02/2014 Recorded   Health Maintenance  Health Maintenance  ???Pending?(in the next year)  ??? ??OverDue  ??? ? ? ?Diabetes Screening due??and every?  ??? ??Due?  ??? ? ? ?ADL Screening due??03/01/19??and every 1??year(s)  ??? ? ? ?Aspirin Therapy for CVD Prevention due??03/01/19??and every 1??year(s)  ??? ? ? ?Lung Cancer Screening due??03/01/19??and every 1??year(s)  ??? ??Due In Future?  ??? ? ? ?Blood Pressure Screening not due until??02/29/20??and every 1??year(s)  ??? ? ? ?Body Mass Index Check not due until??02/29/20??and every 1??year(s)  ??? ? ? ?Depression Screening not due until??02/29/20??and every 1??year(s)  ???Satisfied?(in the past 1 year)  ??? ??Satisfied?  ??? ? ? ?Alcohol Misuse Screening on??03/01/19.??Satisfied by Dejan Jovel MD  ??? ? ? ?Blood Pressure Screening on??03/01/19.??Satisfied by Kim Baca CMA  ??? ? ? ?Body Mass Index Check on??03/01/19.??Satisfied by Kim Baca CMA L.  ??? ? ? ?Depression Screening on??03/01/19.??Satisfied by Dejan Jovel MD  ??? ? ? ?Diabetes Screening on??06/01/18.??Satisfied by Frank Smith  ??? ? ? ?Lipid Screening on??06/01/18.??Satisfied by Frank Smith  ??? ? ? ?Obesity Screening on??03/01/19.??Satisfied by Kim Baca CMA LMichelle  ??? ??Canceled?  ??? ? ? ?Smoking Cessation on??03/01/19.?Recorded by Med DAVIS, Dejan PERSAUD?Reason: Expectation Not Necessary  ?  ?

## 2022-08-09 ENCOUNTER — DOCUMENTATION ONLY (OUTPATIENT)
Dept: ADMINISTRATIVE | Facility: HOSPITAL | Age: 62
End: 2022-08-09

## 2023-05-03 PROBLEM — F51.04 CHRONIC INSOMNIA: Status: ACTIVE | Noted: 2023-05-03

## 2023-05-03 PROBLEM — Z23 IMMUNIZATION DUE: Status: ACTIVE | Noted: 2023-05-03

## 2023-05-03 PROBLEM — Z12.5 PROSTATE CANCER SCREENING: Status: ACTIVE | Noted: 2023-05-03

## 2023-05-03 PROBLEM — E78.00 HYPERCHOLESTEROLEMIA: Status: ACTIVE | Noted: 2023-05-03

## 2023-05-03 PROBLEM — Z00.00 ENCOUNTER FOR WELLNESS EXAMINATION IN ADULT: Status: ACTIVE | Noted: 2023-05-03

## 2023-05-03 PROBLEM — R73.9 HYPERGLYCEMIA: Status: ACTIVE | Noted: 2023-05-03

## 2023-08-07 PROBLEM — Z00.00 ENCOUNTER FOR WELLNESS EXAMINATION IN ADULT: Status: RESOLVED | Noted: 2023-05-03 | Resolved: 2023-08-07

## 2023-09-07 ENCOUNTER — PATIENT MESSAGE (OUTPATIENT)
Dept: RESEARCH | Facility: HOSPITAL | Age: 63
End: 2023-09-07